# Patient Record
Sex: FEMALE | Race: WHITE
[De-identification: names, ages, dates, MRNs, and addresses within clinical notes are randomized per-mention and may not be internally consistent; named-entity substitution may affect disease eponyms.]

---

## 2018-12-31 ENCOUNTER — HOSPITAL ENCOUNTER (EMERGENCY)
Dept: HOSPITAL 10 - E/R | Age: 68
Discharge: HOME | End: 2018-12-31
Payer: MEDICARE

## 2018-12-31 ENCOUNTER — HOSPITAL ENCOUNTER (EMERGENCY)
Dept: HOSPITAL 91 - E/R | Age: 68
Discharge: HOME | End: 2018-12-31
Payer: MEDICARE

## 2018-12-31 VITALS — RESPIRATION RATE: 16 BRPM | HEART RATE: 66 BPM | DIASTOLIC BLOOD PRESSURE: 56 MMHG | SYSTOLIC BLOOD PRESSURE: 123 MMHG

## 2018-12-31 VITALS
HEIGHT: 63 IN | HEIGHT: 63 IN | WEIGHT: 114.42 LBS | WEIGHT: 114.42 LBS | BODY MASS INDEX: 20.27 KG/M2 | BODY MASS INDEX: 20.27 KG/M2

## 2018-12-31 DIAGNOSIS — Z85.3: ICD-10-CM

## 2018-12-31 DIAGNOSIS — R10.13: Primary | ICD-10-CM

## 2018-12-31 LAB
ADD MAN DIFF?: NO
ADD UMIC: NO
ALANINE AMINOTRANSFERASE: 20 IU/L (ref 13–69)
ALBUMIN/GLOBULIN RATIO: 1.5
ALBUMIN: 4.5 G/DL (ref 3.3–4.9)
ALKALINE PHOSPHATASE: 43 IU/L (ref 42–121)
ANION GAP: 10 (ref 5–13)
ASPARTATE AMINO TRANSFERASE: 23 IU/L (ref 15–46)
BASOPHIL #: 0 10^3/UL (ref 0–0.1)
BASOPHILS %: 0.9 % (ref 0–2)
BILIRUBIN,DIRECT: 0 MG/DL (ref 0–0.2)
BILIRUBIN,TOTAL: 0.3 MG/DL (ref 0.2–1.3)
BLOOD UREA NITROGEN: 16 MG/DL (ref 7–20)
CALCIUM: 9.9 MG/DL (ref 8.4–10.2)
CARBON DIOXIDE: 30 MMOL/L (ref 21–31)
CHLORIDE: 104 MMOL/L (ref 97–110)
CREATININE: 0.62 MG/DL (ref 0.44–1)
EOSINOPHILS #: 0.1 10^3/UL (ref 0–0.5)
EOSINOPHILS %: 2.9 % (ref 0–7)
GLOBULIN: 3 G/DL (ref 1.3–3.2)
GLUCOSE: 106 MG/DL (ref 70–220)
HEMATOCRIT: 40.1 % (ref 37–47)
HEMOGLOBIN: 13.2 G/DL (ref 12–16)
IMMATURE GRANS #M: 0.01 10^3/UL (ref 0–0.03)
IMMATURE GRANS % (M): 0.2 % (ref 0–0.43)
LIPASE: 185 U/L (ref 23–300)
LYMPHOCYTES #: 1.7 10^3/UL (ref 0.8–2.9)
LYMPHOCYTES %: 38.4 % (ref 15–51)
MEAN CORPUSCULAR HEMOGLOBIN: 30.3 PG (ref 29–33)
MEAN CORPUSCULAR HGB CONC: 32.9 G/DL (ref 32–37)
MEAN CORPUSCULAR VOLUME: 92 FL (ref 82–101)
MEAN PLATELET VOLUME: 10.5 FL (ref 7.4–10.4)
MONOCYTE #: 0.5 10^3/UL (ref 0.3–0.9)
MONOCYTES %: 11.2 % (ref 0–11)
NEUTROPHIL #: 2.1 10^3/UL (ref 1.6–7.5)
NEUTROPHILS %: 46.4 % (ref 39–77)
NUCLEATED RED BLOOD CELLS #: 0 10^3/UL (ref 0–0)
NUCLEATED RED BLOOD CELLS%: 0 /100WBC (ref 0–0)
PLATELET COUNT: 235 10^3/UL (ref 140–415)
POTASSIUM: 3.9 MMOL/L (ref 3.5–5.1)
RED BLOOD COUNT: 4.36 10^6/UL (ref 4.2–5.4)
RED CELL DISTRIBUTION WIDTH: 13.5 % (ref 11.5–14.5)
SODIUM: 144 MMOL/L (ref 135–144)
TOTAL PROTEIN: 7.5 G/DL (ref 6.1–8.1)
UR ASCORBIC ACID: NEGATIVE MG/DL
UR BILIRUBIN (DIP): NEGATIVE MG/DL
UR BLOOD (DIP): NEGATIVE MG/DL
UR CLARITY: (no result)
UR COLOR: YELLOW
UR GLUCOSE (DIP): NEGATIVE MG/DL
UR KETONES (DIP): (no result) MG/DL
UR LEUKOCYTE ESTERASE (DIP): NEGATIVE LEU/UL
UR MUCUS: (no result) /HPF
UR NITRITE (DIP): NEGATIVE MG/DL
UR PH (DIP): 5 (ref 5–9)
UR RBC: 1 /HPF (ref 0–5)
UR SPECIFIC GRAVITY (DIP): 1.03 (ref 1–1.03)
UR TOTAL PROTEIN (DIP): NEGATIVE MG/DL
UR UROBILINOGEN (DIP): (no result) MG/DL
UR WBC: 1 /HPF (ref 0–5)
WHITE BLOOD COUNT: 4.5 10^3/UL (ref 4.8–10.8)

## 2018-12-31 PROCEDURE — 74176 CT ABD & PELVIS W/O CONTRAST: CPT

## 2018-12-31 PROCEDURE — 81003 URINALYSIS AUTO W/O SCOPE: CPT

## 2018-12-31 PROCEDURE — 80053 COMPREHEN METABOLIC PANEL: CPT

## 2018-12-31 PROCEDURE — 81001 URINALYSIS AUTO W/SCOPE: CPT

## 2018-12-31 PROCEDURE — 85025 COMPLETE CBC W/AUTO DIFF WBC: CPT

## 2018-12-31 PROCEDURE — 83690 ASSAY OF LIPASE: CPT

## 2018-12-31 PROCEDURE — 36415 COLL VENOUS BLD VENIPUNCTURE: CPT

## 2018-12-31 PROCEDURE — 99285 EMERGENCY DEPT VISIT HI MDM: CPT

## 2018-12-31 RX ADMIN — ALUMINUM HYDROXIDE, MAGNESIUM HYDROXIDE, DIMETHICONE 1 ML: 200; 200; 20 SUSPENSION ORAL at 10:31

## 2018-12-31 RX ADMIN — THIAMINE HYDROCHLORIDE 1 MLS/HR: 100 INJECTION, SOLUTION INTRAMUSCULAR; INTRAVENOUS at 10:30

## 2018-12-31 NOTE — ERD
ER Documentation


Chief Complaint


Chief Complaint





abdominal pain x 8 days





HPI


Patient is a 68-year-old female with breast cancer who presents with abdominal 


pain.  She said that 2 months ago she drank juice and afterwards started with 


abdominal pain.  She has had abdominal pain for the past 2 months.  It is upper 


abdominal pain and pain with eating.  She has lost 3 pounds in the past 2 


months.  She has no fevers, vomiting, or diarrhea.  She does have a history of 


H. pylori infection in the past.  She has plans to follow-up with a GI doctor 


this week.  Upon review of old medical records this is the patient's first visit


to the emergency department.





ROS


All systems reviewed and are negative except as per history of present illness.





Medications


Home Meds


Active Scripts


Mag Hydrox/Al Hydrox/Simeth (Maalox Plus X-Strength Susp) 355 Ml Oral.susp, 355 


ML PO BID, #1


   Prov:PAYTON HORN MD         12/31/18


Pantoprazole* (Protonix*) 40 Mg Tablet.dr, 40 MG PO DAILY, #20 TAB


   Prov:PAYTON HORN MD         12/31/18


Discontinued Reported Medications


[Litrosol]   No Conflict Check, 2.5 MG PO DAILY


   11/13/15





Allergies


Allergies:  


Coded Allergies:  


     acetaminophen (Unverified  Allergy, Unknown, 12/31/18)


     ciprofloxacin (Unverified  Allergy, Unknown, 12/31/18)


     hydrocodone (Unverified  Allergy, Unknown, 12/31/18)


     sulfamethoxazole (Unverified  Allergy, Unknown, 12/31/18)


     trimethoprim (Unverified  Allergy, Unknown, 12/31/18)





PMhx/Soc


History of Surgery:  Yes (RIGHT BREASTMASTECTOMY)


Anesthesia Reaction:  No


Hx Neurological Disorder:  No


Hx Respiratory Disorders:  No


Hx Cardiac Disorders:  No


Hx Psychiatric Problems:  No


Hx Miscellaneous Medical Probl:  No


Hx Alcohol Use:  No


Hx Substance Use:  No


Hx Tobacco Use:  No


Smoking Status:  Never smoker





FmHx


Family History:  diabetes





Physical Exam


Vitals





Vital Signs


  Date      Temp  Pulse  Resp  B/P (MAP)   Pulse Ox  O2          O2 Flow    FiO2


Time                                                 Delivery    Rate


  12/31/18           66    16      123/56       100  Room Air


     11:12                           (78)


  12/31/18  97.5     70    18      136/67        98


     10:00                           (90)





Physical Exam


Const:   No acute distress


Head:   Atraumatic 


Eyes:    Normal Conjunctiva


ENT:    Normal External Ears, Nose and Mouth.


Neck:               Full range of motion. No meningismus.


Resp:   Clear to auscultation bilaterally


Cardio:   Regular rate and rhythm, no murmurs


Abd:    Soft, non tender, non distended. Normal bowel sounds


Skin:   No petechiae or rashes


Back:   No midline or flank tenderness


Ext:    No cyanosis, or edema


Neur:   Awake and alert


Psych:    Normal Mood and Affect


Result Diagram:  


12/31/18 1022                                                                   


            12/31/18 1022





Results 24 hrs





Laboratory Tests


              Test
                                 12/31/18
10:22


              White Blood Count                       4.5 10^3/ul


              Red Blood Count                        4.36 10^6/ul


              Hemoglobin                                13.2 g/dl


              Hematocrit                                   40.1 %


              Mean Corpuscular Volume                     92.0 fl


              Mean Corpuscular Hemoglobin                 30.3 pg


              Mean Corpuscular Hemoglobin
Concent      32.9 g/dl 



              Red Cell Distribution Width                  13.5 %


              Platelet Count                          235 10^3/UL


              Mean Platelet Volume                        10.5 fl


              Immature Granulocytes %                     0.200 %


              Neutrophils %                                46.4 %


              Lymphocytes %                                38.4 %


              Monocytes %                                  11.2 %


              Eosinophils %                                 2.9 %


              Basophils %                                   0.9 %


              Nucleated Red Blood Cells %             0.0 /100WBC


              Immature Granulocytes #               0.010 10^3/ul


              Neutrophils #                           2.1 10^3/ul


              Lymphocytes #                           1.7 10^3/ul


              Monocytes #                             0.5 10^3/ul


              Eosinophils #                           0.1 10^3/ul


              Basophils #                             0.0 10^3/ul


              Nucleated Red Blood Cells #             0.0 10^3/ul


              Urine Color                          YELLOW


              Urine Clarity
                       SLIGHTLY
CLOUDY


              Urine pH                                        5.0


              Urine Specific Gravity                        1.028


              Urine Ketones                        TRACE mg/dL


              Urine Nitrite                        NEGATIVE mg/dL


              Urine Bilirubin                      NEGATIVE mg/dL


              Urine Urobilinogen                         1+ mg/dL


              Urine Leukocyte Esterase
            NEGATIVE
Gregory/ul


              Urine Microscopic RBC                        1 /HPF


              Urine Microscopic WBC                        1 /HPF


              Urine Mucus                          MANY /HPF


              Urine Hemoglobin                     NEGATIVE mg/dL


              Urine Glucose                        NEGATIVE mg/dL


              Urine Total Protein                  NEGATIVE mg/dl


              Sodium Level                             144 mmol/L


              Potassium Level                          3.9 mmol/L


              Chloride Level                           104 mmol/L


              Carbon Dioxide Level                      30 mmol/L


              Anion Gap                                        10


              Blood Urea Nitrogen                        16 mg/dl


              Creatinine                               0.62 mg/dl


              Est Glomerular Filtrat Rate
mL/min   > 60 mL/min 



              Glucose Level                             106 mg/dl


              Calcium Level                             9.9 mg/dl


              Total Bilirubin                           0.3 mg/dl


              Direct Bilirubin                         0.00 mg/dl


              Indirect Bilirubin                        0.3 mg/dl


              Aspartate Amino Transf
(AST/SGOT)          23 IU/L 



              Alanine Aminotransferase
(ALT/SGPT)        20 IU/L 



              Alkaline Phosphatase                        43 IU/L


              Total Protein                              7.5 g/dl


              Albumin                                    4.5 g/dl


              Globulin                                  3.00 g/dl


              Albumin/Globulin Ratio                         1.50


              Lipase                                      185 U/L





Current Medications


 Medications
   Dose
          Sig/Brisa
       Start Time
   Status  Last


 (Trade)       Ordered        Route
 PRN     Stop Time              Admin
Dose


                              Reason                                Admin


 Sodium         1,000 ml @ 
   Q1H STAT
      12/31/18      DC          12/31/18


Chloride       1,000 mls/hr   IV
            10:10
                       10:30



                                             12/31/18


                                             11:09


                40 ml          ONCE  STAT
    12/31/18      DC          12/31/18


Miscellaneous                 PO
            10:10
                       10:31




 Medication
                                12/31/18


 (Gi Cocktail                                10:12


(2))








Procedures/MDM


CT abdomen pelvis read by radiology.





Patient is a 68-year-old female with history of previous breast cancer who 


presents with abdominal pain.  The patient has basically normal laboratory 


studies and a CT scan which shows no signs of surgical process or obstruction.  


I doubt appendicitis, cholecystitis, pancreatitis, or bowel obstruction.  I 


believe outpatient management is appropriate but the patient will need close 


follow-up with her primary doctor within 24-48 hours for reevaluation.  The 


patient can return for any worsening symptoms.





Departure


Diagnosis:  


   Primary Impression:  


   Abdominal pain


   Abdominal location:  epigastric  Qualified Codes:  R10.13 - Epigastric pain


Condition:  Fair


Patient Instructions:  Abdominal Pain


Referrals:  


Dr. Chatman





Additional Instructions:  


Llame al doctor MAANA y юлия sana MICHA PARA DENTRO DE 1-2 SIGALA.Dgale a la 


secretaria que nosotros le instruimos hacer esta micha.Avise o llame si bonilla 


condicin se empeora antes de la micha. Regresa aqui si peor o no mejor.











PAYTON HORN MD                Dec 31, 2018 13:02

## 2019-01-10 ENCOUNTER — HOSPITAL ENCOUNTER (OUTPATIENT)
Dept: HOSPITAL 10 - GIL | Age: 69
Discharge: HOME | End: 2019-01-10
Attending: INTERNAL MEDICINE
Payer: MEDICARE

## 2019-01-10 ENCOUNTER — HOSPITAL ENCOUNTER (OUTPATIENT)
Dept: HOSPITAL 91 - GIL | Age: 69
Discharge: HOME | End: 2019-01-10
Payer: MEDICARE

## 2019-01-10 VITALS — SYSTOLIC BLOOD PRESSURE: 150 MMHG | DIASTOLIC BLOOD PRESSURE: 68 MMHG | HEART RATE: 64 BPM | RESPIRATION RATE: 18 BRPM

## 2019-01-10 VITALS
WEIGHT: 113.1 LBS | BODY MASS INDEX: 18.84 KG/M2 | WEIGHT: 113.1 LBS | HEIGHT: 65 IN | HEIGHT: 65 IN | BODY MASS INDEX: 18.84 KG/M2

## 2019-01-10 VITALS — HEART RATE: 70 BPM | SYSTOLIC BLOOD PRESSURE: 120 MMHG | RESPIRATION RATE: 14 BRPM | DIASTOLIC BLOOD PRESSURE: 57 MMHG

## 2019-01-10 DIAGNOSIS — K44.9: Primary | ICD-10-CM

## 2019-01-10 DIAGNOSIS — K29.60: ICD-10-CM

## 2019-01-10 DIAGNOSIS — K21.9: ICD-10-CM

## 2019-01-10 PROCEDURE — 88305 TISSUE EXAM BY PATHOLOGIST: CPT

## 2019-01-10 PROCEDURE — 43239 EGD BIOPSY SINGLE/MULTIPLE: CPT

## 2019-01-12 NOTE — CONS
DATE OF ADMISSION: 01/10/2019

DATE OF CONSULTATION:  

 

 

 

PATIENT NAME: SIVA NUNEZ

 

TYPE OF CONSULTATION:  Preoperative gastroenterology.

 

Dear Dr. Ocasio and Dr. Connor:

 

I thank you very much for this kind referral.  Ms. Siva Aponte is a 68-year-old female patie
nt who has been referred to me for further evaluation of abdominal pain.  The patient states she has 
upper and lower abdominal pain and she has been taking Zantac with partial relief.  The patient has h
istory of gastritis.  Not on nonsteroidal anti-inflammatory agents.  She states her appetite has been
 poor and she has been losing weight.  No history of gallstones or liver disease.  The patient is kno
wn to have diverticulosis of the colon.  The patient got screening colonoscopy 3 years ago and no col
on neoplasm was identified.  She denies any change in the bowel habit or rectal bleeding.  She is not
 a hypertensive or diabetic.  No heart disease, lung problem or kidney disease.  She has history of b
reast cancer for which she has undergone surgery, radiation and chemotherapy.

 

SOCIAL HISTORY:  Nonsmoker.  No alcohol abuse.

 

FAMILY HISTORY:  No family history of gastrointestinal tract neoplasm.

 

ALLERGIES:  SHE STATES SHE IS ALLERGIC TO:

1.  VICODIN.

2.  CIPRO.

3.  BACTRIM.

 

PHYSICAL EXAMINATION:

VITAL SIGNS:  She is 5 feet, 5 inches tall and weighs 110 pounds, BMI 19, blood pressure 124/72.

HEART:  Normal heart sounds.

LUNGS:  Clear.

ABDOMEN:  Soft.  No masses.  Normal bowel sounds.

NEUROLOGIC:  Normal.

 

IMPRESSION:

1.  Upper abdominal pain, not responding to therapy with Zantac.

2.  The patient also complains of loss of appetite and weight loss.

3.  The patient had a screening colonoscopy 3 years ago and she was noted to have diverticulosis of t
he colon and no colon neoplasm was identified.

4.  The patient states she had an abdominal ultrasound done and it was normal.

4.  Status post surgery, radiation and chemotherapy for breast cancer.

5.  HISTORY OF ALLERGY TO VICODIN, CIPRO AND BACTRIM.

 

PLAN:  Endoscopic examination for further evaluation.

 

The procedure and possible complications are well explained to the patient.  She understands and cons
ents to the procedure.

 

I thank you once again.

 

With warmest personal regards,

 

 

Dictated By: JANN GRAFF/OBINNA

DD:    01/07/2019 18:35:29

DT:    01/07/2019 19:00:29

Conf#: [id number]

DID#:  [id number]

CC: Dr. Ocasio; LUCILA CONNOR MD;*End*

## 2019-05-05 ENCOUNTER — HOSPITAL ENCOUNTER (EMERGENCY)
Dept: HOSPITAL 10 - E/R | Age: 69
Discharge: HOME | End: 2019-05-05
Payer: MEDICARE

## 2019-05-05 ENCOUNTER — HOSPITAL ENCOUNTER (EMERGENCY)
Dept: HOSPITAL 91 - E/R | Age: 69
Discharge: HOME | End: 2019-05-05
Payer: MEDICARE

## 2019-05-05 VITALS — DIASTOLIC BLOOD PRESSURE: 71 MMHG | HEART RATE: 78 BPM | RESPIRATION RATE: 16 BRPM | SYSTOLIC BLOOD PRESSURE: 147 MMHG

## 2019-05-05 VITALS — WEIGHT: 115.08 LBS | HEIGHT: 55 IN | BODY MASS INDEX: 26.63 KG/M2

## 2019-05-05 DIAGNOSIS — Z85.3: ICD-10-CM

## 2019-05-05 DIAGNOSIS — F17.210: ICD-10-CM

## 2019-05-05 DIAGNOSIS — R10.30: Primary | ICD-10-CM

## 2019-05-05 LAB
ADD MAN DIFF?: NO
ALANINE AMINOTRANSFERASE: 14 IU/L (ref 13–69)
ALBUMIN/GLOBULIN RATIO: 1.2
ALBUMIN: 4.2 G/DL (ref 3.3–4.9)
ALKALINE PHOSPHATASE: 53 IU/L (ref 42–121)
ANION GAP: 6 (ref 5–13)
ASPARTATE AMINO TRANSFERASE: 22 IU/L (ref 15–46)
BASOPHIL #: 0 10^3/UL (ref 0–0.1)
BASOPHILS %: 0.5 % (ref 0–2)
BILIRUBIN,DIRECT: 0 MG/DL (ref 0–0.2)
BILIRUBIN,TOTAL: 0.4 MG/DL (ref 0.2–1.3)
BLOOD UREA NITROGEN: 14 MG/DL (ref 7–20)
CALCIUM: 10.4 MG/DL (ref 8.4–10.2)
CARBON DIOXIDE: 30 MMOL/L (ref 21–31)
CHLORIDE: 106 MMOL/L (ref 97–110)
CREATININE: 0.69 MG/DL (ref 0.44–1)
EOSINOPHILS #: 0.2 10^3/UL (ref 0–0.5)
EOSINOPHILS %: 2.9 % (ref 0–7)
GLOBULIN: 3.5 G/DL (ref 1.3–3.2)
GLUCOSE: 101 MG/DL (ref 70–220)
HEMATOCRIT: 39.8 % (ref 37–47)
HEMOGLOBIN: 13 G/DL (ref 12–16)
IMMATURE GRANS #M: 0.01 10^3/UL (ref 0–0.03)
IMMATURE GRANS % (M): 0.2 % (ref 0–0.43)
LIPASE: 154 U/L (ref 23–300)
LYMPHOCYTES #: 1.8 10^3/UL (ref 0.8–2.9)
LYMPHOCYTES %: 32.3 % (ref 15–51)
MEAN CORPUSCULAR HEMOGLOBIN: 29.9 PG (ref 29–33)
MEAN CORPUSCULAR HGB CONC: 32.7 G/DL (ref 32–37)
MEAN CORPUSCULAR VOLUME: 91.5 FL (ref 82–101)
MEAN PLATELET VOLUME: 10.2 FL (ref 7.4–10.4)
MONOCYTE #: 0.6 10^3/UL (ref 0.3–0.9)
MONOCYTES %: 10.6 % (ref 0–11)
NEUTROPHIL #: 3 10^3/UL (ref 1.6–7.5)
NEUTROPHILS %: 53.5 % (ref 39–77)
NUCLEATED RED BLOOD CELLS #: 0 10^3/UL (ref 0–0)
NUCLEATED RED BLOOD CELLS%: 0 /100WBC (ref 0–0)
PLATELET COUNT: 206 10^3/UL (ref 140–415)
POTASSIUM: 4.8 MMOL/L (ref 3.5–5.1)
RED BLOOD COUNT: 4.35 10^6/UL (ref 4.2–5.4)
RED CELL DISTRIBUTION WIDTH: 13.1 % (ref 11.5–14.5)
SODIUM: 142 MMOL/L (ref 135–144)
TOTAL PROTEIN: 7.7 G/DL (ref 6.1–8.1)
URINE PH (DIP) POC: 5.5 (ref 5–8.5)
WHITE BLOOD COUNT: 5.6 10^3/UL (ref 4.8–10.8)

## 2019-05-05 PROCEDURE — 36415 COLL VENOUS BLD VENIPUNCTURE: CPT

## 2019-05-05 PROCEDURE — 74177 CT ABD & PELVIS W/CONTRAST: CPT

## 2019-05-05 PROCEDURE — 83690 ASSAY OF LIPASE: CPT

## 2019-05-05 PROCEDURE — 99285 EMERGENCY DEPT VISIT HI MDM: CPT

## 2019-05-05 PROCEDURE — 85025 COMPLETE CBC W/AUTO DIFF WBC: CPT

## 2019-05-05 PROCEDURE — 96375 TX/PRO/DX INJ NEW DRUG ADDON: CPT

## 2019-05-05 PROCEDURE — 81003 URINALYSIS AUTO W/O SCOPE: CPT

## 2019-05-05 PROCEDURE — 80053 COMPREHEN METABOLIC PANEL: CPT

## 2019-05-05 PROCEDURE — 96374 THER/PROPH/DIAG INJ IV PUSH: CPT

## 2019-05-05 RX ADMIN — VASOPRESSIN 1 ML/MIN: 20 INJECTION, SOLUTION INTRAMUSCULAR; SUBCUTANEOUS at 15:25

## 2019-05-05 RX ADMIN — IOHEXOL 1 ML: 300 INJECTION, SOLUTION INTRAVENOUS at 15:25

## 2019-05-05 RX ADMIN — ONDANSETRON HYDROCHLORIDE 1 MG: 2 INJECTION, SOLUTION INTRAMUSCULAR; INTRAVENOUS at 14:22

## 2019-05-14 ENCOUNTER — HOSPITAL ENCOUNTER (EMERGENCY)
Dept: HOSPITAL 10 - E/R | Age: 69
Discharge: HOME | End: 2019-05-14
Payer: MEDICARE

## 2019-05-14 VITALS — WEIGHT: 113.32 LBS | HEIGHT: 55 IN | BODY MASS INDEX: 26.22 KG/M2

## 2019-05-14 VITALS — RESPIRATION RATE: 16 BRPM | DIASTOLIC BLOOD PRESSURE: 84 MMHG | SYSTOLIC BLOOD PRESSURE: 126 MMHG | HEART RATE: 80 BPM

## 2019-05-14 DIAGNOSIS — R10.13: Primary | ICD-10-CM

## 2019-05-14 PROCEDURE — 99284 EMERGENCY DEPT VISIT MOD MDM: CPT

## 2019-05-14 NOTE — ERD
ER Documentation


Chief Complaint


Chief Complaint





AP, HERE A MONTH AGO W/SAME





HPI


During the patient's encounter translation services were utilized


Language: [Botswanan]  


Source:  [in person] 





68-year-old female who was just seen here several days ago for abdominal pain 


with a CT scan.  Patient states that she is here with epigastric and 


periumbilical abdominal discomfort that occurs every time she eats a heavy meal.


 Patient states the past she has received a GI cocktail with complete resolution


of symptoms.  She is asking for GI cocktail.  She denies any chest pain or 


exertional symptoms, no shortness of breath, no hematemesis, no constipation or 


melena.  Patient has follow-up with gastroenterologist next week.





ROS


All systems reviewed and are negative except as per history of present illness.





Medications


Home Meds


Active Scripts


Sucralfate* (Carafate*) 1 Gm Tab, 1 GM PO WITH MEALS for 14 Days, TAB


   Prov:GAGANDEEP ARIZMENDI MD         5/14/19


Omeprazole* (Omeprazole*) 20 Mg Capsule.dr, 20 MG PO DAILY for 30 Days


   Prov:GAGANDEEP ARIZMENDI MD         5/14/19


Mag Hydrox/Al Hydrox/Simeth (Maalox Plus X-Strength Susp) 355 Ml Oral.susp, 355 


ML PO BID, #1


   Prov:PAYTON HORN MD         12/31/18


Pantoprazole* (Protonix*) 40 Mg Tablet.dr, 40 MG PO DAILY, #20 TAB


   Prov:PAYTON OHRN MD         12/31/18





Allergies


Allergies:  


Coded Allergies:  


     acetaminophen (Unverified  Allergy, Unknown, 12/31/18)


     ciprofloxacin (Unverified  Allergy, Unknown, 12/31/18)


     hydrocodone (Unverified  Allergy, Unknown, 12/31/18)


     sulfamethoxazole (Unverified  Allergy, Unknown, 12/31/18)


     trimethoprim (Unverified  Allergy, Unknown, 12/31/18)





PMhx/Soc


History of Surgery:  Yes (RIGHT BREAST MASTECTOMY)


Anesthesia Reaction:  No


Hx Neurological Disorder:  No


Hx Respiratory Disorders:  No


Hx Cardiac Disorders:  No


Hx Psychiatric Problems:  No


Hx Miscellaneous Medical Probl:  No


Hx Alcohol Use:  No


Hx Substance Use:  No


Hx Tobacco Use:  No





FmHx


Family History:  No diabetes





Physical Exam


Vitals





Vital Signs


  Date      Temp  Pulse  Resp  B/P (MAP)   Pulse Ox  O2          O2 Flow    FiO2


Time                                                 Delivery    Rate


   5/14/19  98.1     70    18      133/60        99


     15:26                           (84)





Physical Exam


General: Well developed, well nourished, no acute distress


Head: Normocephalic, atraumatic.


Eyes: Pupils equally reactive, EOM intact


ENT: Moist mucous membranes


Neck: Supple, no lymphadenopathy


Respiratory: Lungs clear bilaterally, no distress


Cardiovascular: RRR, no murmurs, rubs, or gallops


Abdominal: Soft, non-tender, non-distended, no peritoneal signs, negative Carrillo


sign, no tenderness to McBurney's point


: Deferred


MSK: No edema, no unilateral swelling, 5/5 strength


Neurologic: Alert and oriented, moving all extremities, normal speech, no focal 


weakness, no cerebellar signs


Skin: No rash


Psych: Normal mood


Results 24 hrs





Current Medications


 Medications
   Dose
          Sig/Brisa
       Start Time
   Status  Last


 (Trade)       Ordered        Route
 PRN     Stop Time              Admin
Dose


                              Reason                                Admin


 Famotidine
    20 mg          ONCE  STAT
    5/14/19       DC       



(Pepcid)                      PO
            18:49



                                             5/14/19 18:50


                40 ml          ONCE  STAT
    5/14/19       DC       



Miscellaneous                 PO
            18:49




 Medication
                                5/14/19 18:50


 (Gi Cocktail


(2))


 Dicyclomine    10 mg          ONCE  ONCE
    5/14/19                



HCl
                          PO
            19:00



(Bentyl)                                     5/14/19 19:01








Procedures/MDM


CT from  prior visit


 


IMPRESSION:


1.  There is a small amount of free fluid in the pelvis.


2.  There is a 2 cm enhancing lesion in the posterior pelvis which may represent


a fibroid in a retroflexed uterus. However, the uterus is not well delineated 


and unsure if it is atrophic or surgically absent. Clinical correlation is 


recommended.


3.  Punctate calcification in the liver is likely related to old granulomatous 


disease.


4.  There is a 5 mm low attenuation lesion in the left hepatic lobe which is too


small to characterize, however statistically likely represents a cyst.








LAB INTERPRETATION:


I recommended laboratory testing with the patient is refusing.  She states that 


she just had her blood drawn and does not want another needlestick.  She 


understands the risk benefits and alternatives





MEDICAL DECISION MAKING:


Patient's presentation is very consistent with likely dyspepsia, peptic ulcer 


disease versus gastritis.  She has a benign abdominal exam and just recently had


a CAT scan.





At this point I do not believe this is consistent with acute intra-abdominal 


process such as malignancy, obstruction or acute infectious process.  I do not 


believe the repeat CT imaging is necessary.  The patient does not wish to have 


repeat blood testing.  She has appropriate follow-up with a gastroenterologist.





Patient was given a GI cocktail with improved symptomatology and will be 


discharged with follow-up.





She has no chest pain or exertional symptoms to suggest cardiac etiology.  No 


evidence of vascular process.





CONSULTATION:


None





DISPOSITION PLAN:


The patient does not have an identifiable emergent medical condition that 


warrants inpatient hospitalization at this time.  The patient is deemed safe for


discharge with outpatient follow-up.





We discussed follow up with the patient's primary care doctor within 24 to 48 


hours as needed.  We also discussed return to the emergency room for worsening 


symptoms or worsening condition.





Outpatient referral: Gastroenterology as planned





Discharge Medications:


Sucralfate, Prilosec





Departure


Diagnosis:  


   Primary Impression:  


   Abdominal pain


   Abdominal location:  epigastric  Qualified Codes:  R10.13 - Epigastric pain


   Additional Impression:  


   Dyspepsia


Condition:  Stable


Patient Instructions:  Gerd (Adult), Peptic Ulcer Disease (All Causes)


Referrals:  


[facility]


Usted se ha hecho un examen mdico de control que le indica que no est en sana 


condicin que requiera tratamiento urgente en el Departamento de Emergencia. Un 


estudio ms profundo y el tratamiento de bonilla condicin pueden esperar sin ningn 


riesgo hasta que usted sea atendida/o en el consultorio de bonilla mdico o sana 


clnica. Es responsabilidad suya arreglar sana micha para el seguimiento del josh.








MANEJO DE CONDICIONES NO URGENTES EN EL FUTURO


1) Si usted tiene un mdico de atencin primaria:





Usted debera llamar a bonilla mdico de atencin primaria antes de venir al 


departamento de emergencia. Despus de las horas de consultorio, bonilla doctor o bonilla 


asociado/a est disponible por telfono. El mdico o enfermero de bipin en el 


servicio telefnico puede asesorarle por katie medio para atender el problema, o 


josh contrario se puede programar sana micha.





2) Si usted no tiene un mdico de atencin primaria:


Llame al mdico o clnica de referencia que aparece abajo ary las horas de 


consultorio para hacer sana micha para que le vean.





CLINICAS:


[facility]  [phone] [address] [facility]  [phone]


[address] [facility] [phone]


[address] [place] [id number] [phone] [place] [id number] [phone] [facility] [phone] 


[address] [facility]. [facility] ()


Usted se ha hecho un examen mdico de control que le indica que no est en sana 


condicin que requiera tratamiento urgente en el Departamento de Emergencia. Un 


estudio ms profundo y el tratamiento de bonilla condicin pueden esperar sin ningn 


riesgo hasta que usted sea atendida/o en el consultorio de bonilla mdico o sana 


clnica. Es responsabilidad suya arreglar sana micha para el seguimiento del josh.








MANEJO DE CONDICIONES NO URGENTES EN EL FUTURO


1) Si usted tiene un mdico de atencin primaria:





Usted debera llamar a bonilla mdico de atencin primaria antes de venir al 


departamento de emergencia. Despus de las horas de consultorio, bonilla doctor o bonilla 


asociado/a est disponible por telfono. El mdico o enfermero de bipin en el 


servicio telefnico puede asesorarle por katie medio para atender el problema, o 


josh contrario se puede programar sana micha.





2) Si usted no tiene un mdico de atencin primaria:


Llame al mdico o condado institucions de referencia que aparece abajo ary 


las horas de consultorio para hacer sana micha para que le vean.








SI USTED NO PUEDE PAGAR PARA HETAL UN MEDICO puede ir a:


[facility] 


[address] [place] [address]





[facility] 


[address] [place] [address]





[facility] 


[address] [place] [address]





[facility] [place]


[facility]


[address] [place], [place] [id number] [phone]





Additional Instructions:  


Llame al doctor nombrado abajo (Referral Sources) MAANA y юлия sana MICHA PARA 


DENTRO DE SANA SEMANA.


Dgale a la secretaria que nosotros le instruimos hacer esta micha.Avise o llame 


si bnoilla condicin se empeora antes de la micha.











GAGANDEEP ARIZMENDI MD          May 14, 2019 18:57

## 2019-05-20 ENCOUNTER — OFFICE (OUTPATIENT)
Dept: URBAN - METROPOLITAN AREA CLINIC 67 | Facility: CLINIC | Age: 69
End: 2019-05-20

## 2019-05-20 VITALS — DIASTOLIC BLOOD PRESSURE: 60 MMHG | HEIGHT: 65 IN | WEIGHT: 111 LBS | SYSTOLIC BLOOD PRESSURE: 100 MMHG

## 2019-05-20 DIAGNOSIS — R10.30 ABDOMINAL PAIN, OTHER OR MULTIPLE SITES: ICD-10-CM

## 2019-05-20 PROCEDURE — 99243 OFF/OP CNSLTJ NEW/EST LOW 30: CPT | Performed by: NURSE PRACTITIONER

## 2019-05-20 RX ORDER — DICYCLOMINE HYDROCHLORIDE 10 MG/1
CAPSULE ORAL
Qty: 90 | Status: ACTIVE

## 2019-05-20 NOTE — SERVICEHPINOTES
This is a   68   year old  female   seen   in consultation at the request of Dr. SOPHY BECKWITH  . Previously seen by Dr Joy for diffuse abd pain w/ onset post drinking aloe vera juice in 10/2018. Pain was intermittent for a while but because of its persistence she underwent an EGD w/ Dr Joy in 1/2019 c/w gastritis only. Was rx'd Omeprazole 40 mg daily which she continues to take. A previous colonoscopy in 2015 per patient significant for IH.  Patient continues to c/o "all over" abd pain on a daily basis of varying intensities, worse after meals, #6 on the pain scale. Has visited the ER at [facility] twice already this month   on 5/5/19 and 5/14/19. A CT scan of A/P w/ contrast was non-diagnostic of her pain and was discharged w/ a n rx for Bentyl 20 mg q6hrs on 5/5/19 which she has not filled yet.  States she lost 4 lbs this month 2/2 decreased food intake. Denies N/V, dysphagia, epigastric pain, D/C, rectal bleeding or melena. States the only "medication" helping so far is the cocktail given to her in the ER. Patient lives alone, does not work and reports not having people to talk to but that she is able to "relax".

## 2019-06-17 ENCOUNTER — OFFICE (OUTPATIENT)
Dept: URBAN - METROPOLITAN AREA CLINIC 67 | Facility: CLINIC | Age: 69
End: 2019-06-17

## 2019-06-17 VITALS — WEIGHT: 110 LBS | SYSTOLIC BLOOD PRESSURE: 124 MMHG | HEIGHT: 65 IN | DIASTOLIC BLOOD PRESSURE: 62 MMHG

## 2019-06-17 DIAGNOSIS — R10.30 ABDOMINAL PAIN, OTHER OR MULTIPLE SITES: ICD-10-CM

## 2019-06-17 DIAGNOSIS — K29.70 GASTRITIS: ICD-10-CM

## 2019-06-17 PROCEDURE — 99213 OFFICE O/P EST LOW 20 MIN: CPT | Performed by: NURSE PRACTITIONER

## 2019-06-17 RX ORDER — OMEPRAZOLE 20 MG/1
CAPSULE, DELAYED RELEASE ORAL
Qty: 60 | Status: ACTIVE

## 2019-06-17 NOTE — SERVICEHPINOTES
JEANNETTE DOTSON   returns today for follow-up from last visit on   5/20/2019  .    Last seen w/ following notes and recommendations: Diffuse abd pain worse after meals. S/P unremarkable EGD 1/2019 other than gastritis however abd pain failed Omeprazole. Had 2 ER visits this month w/ non-diagnostic CT scan of A/P w contrast 5/5/19. Requesting blood work. Was rx'd Bentyl at that time but pt did not fill. No other associated sx but some wt loss 2/2 decreased food intake because of the subsequent pain after meals. Recommend trial of already rx'd Bentyl, as pain most likely functional. F/U in 1 month if still sx. Consider colonoscopy although low yield at this time. Patient reassured. BR? BRPatient returns today I follow up. Feeling much better w/ only occasional RUQ "liver" pain now that she's been on daily Omeprazole 20 mg qam 30 min ac breakfast. Stopped ranitidine as it s not helpful. Has not tried the antispasmodics yet.